# Patient Record
Sex: MALE | Race: OTHER | HISPANIC OR LATINO
[De-identification: names, ages, dates, MRNs, and addresses within clinical notes are randomized per-mention and may not be internally consistent; named-entity substitution may affect disease eponyms.]

---

## 2021-08-25 PROBLEM — Z00.00 ENCOUNTER FOR PREVENTIVE HEALTH EXAMINATION: Status: ACTIVE | Noted: 2021-08-25

## 2021-08-31 ENCOUNTER — APPOINTMENT (OUTPATIENT)
Dept: GASTROENTEROLOGY | Facility: CLINIC | Age: 31
End: 2021-08-31

## 2021-10-05 ENCOUNTER — APPOINTMENT (OUTPATIENT)
Dept: GASTROENTEROLOGY | Facility: CLINIC | Age: 31
End: 2021-10-05
Payer: COMMERCIAL

## 2021-10-05 DIAGNOSIS — Z78.9 OTHER SPECIFIED HEALTH STATUS: ICD-10-CM

## 2021-10-05 DIAGNOSIS — Z82.49 FAMILY HISTORY OF ISCHEMIC HEART DISEASE AND OTHER DISEASES OF THE CIRCULATORY SYSTEM: ICD-10-CM

## 2021-10-05 PROCEDURE — 99204 OFFICE O/P NEW MOD 45 MIN: CPT | Mod: 95

## 2021-10-05 NOTE — ASSESSMENT
[FreeTextEntry1] : 30 year old male patient average risk for CRC, presents with alternating diarrhea-constipation, associated with bloating. No nausea, vomiting, dysphagia, but has a feeling of something stuck in his esophagus and heartburn. Symptoms are not related to food. \par No blood in the stools. Stable weight. \par Reports crampy abdominal pain with urgency and pain resolves thereafter.\par \par R/O GERD\par needs EGD\par Risks and benefits discussed with patient.\par not keen on therapeutic trial

## 2021-10-05 NOTE — HISTORY OF PRESENT ILLNESS
[Home] : at home, [unfilled] , at the time of the visit. [Medical Office: ([facility])___] : at the medical office located in  [Verbal consent obtained from patient] : the patient, [unfilled] [FreeTextEntry4] : Mana Kurtz [de-identified] : 30 year old male patient average risk for CRC, presents with alternating diarrhea-constipation, associated with bloating. No nausea, vomiting, dysphagia, but has a feeling of something stuck in his esophagus and heartburn. Symptoms are not related to food. \par No blood in the stools. Stable weight. \par Reports crampy abdominal pain with urgency and pain resolves thereafter.

## 2021-10-05 NOTE — PHYSICAL EXAM
[General Appearance - Alert] : alert [Hearing Threshold Finger Rub Not [place]] : hearing was normal [] : no respiratory distress [Skin Color & Pigmentation] : normal skin color and pigmentation [Oriented To Time, Place, And Person] : oriented to person, place, and time

## 2021-10-25 ENCOUNTER — LABORATORY RESULT (OUTPATIENT)
Age: 31
End: 2021-10-25

## 2021-10-25 ENCOUNTER — OUTPATIENT (OUTPATIENT)
Dept: OUTPATIENT SERVICES | Facility: HOSPITAL | Age: 31
LOS: 1 days | Discharge: HOME | End: 2021-10-25

## 2021-10-25 DIAGNOSIS — Z11.59 ENCOUNTER FOR SCREENING FOR OTHER VIRAL DISEASES: ICD-10-CM

## 2021-10-28 ENCOUNTER — RESULT REVIEW (OUTPATIENT)
Age: 31
End: 2021-10-28

## 2021-10-28 ENCOUNTER — TRANSCRIPTION ENCOUNTER (OUTPATIENT)
Age: 31
End: 2021-10-28

## 2021-10-28 ENCOUNTER — OUTPATIENT (OUTPATIENT)
Dept: OUTPATIENT SERVICES | Facility: HOSPITAL | Age: 31
LOS: 1 days | Discharge: HOME | End: 2021-10-28
Payer: COMMERCIAL

## 2021-10-28 VITALS
RESPIRATION RATE: 17 BRPM | DIASTOLIC BLOOD PRESSURE: 55 MMHG | HEART RATE: 60 BPM | HEIGHT: 67 IN | OXYGEN SATURATION: 100 % | TEMPERATURE: 97 F | WEIGHT: 160.06 LBS | SYSTOLIC BLOOD PRESSURE: 111 MMHG

## 2021-10-28 VITALS — DIASTOLIC BLOOD PRESSURE: 61 MMHG | HEART RATE: 64 BPM | SYSTOLIC BLOOD PRESSURE: 107 MMHG

## 2021-10-28 DIAGNOSIS — S09.93XA UNSPECIFIED INJURY OF FACE, INITIAL ENCOUNTER: Chronic | ICD-10-CM

## 2021-10-28 PROCEDURE — 88312 SPECIAL STAINS GROUP 1: CPT | Mod: 26

## 2021-10-28 PROCEDURE — 88305 TISSUE EXAM BY PATHOLOGIST: CPT | Mod: 26

## 2021-10-28 PROCEDURE — 43239 EGD BIOPSY SINGLE/MULTIPLE: CPT
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
## 2021-10-28 NOTE — ASU PREOP CHECKLIST - VERIFY SURGICAL SITE/SIDE WITH PATIENT
Stroke-like symptoms vs encephalopathy from UTI  Day #2 of Levaquin - E coli present    Day #1/3 done

## 2021-10-28 NOTE — ASU DISCHARGE PLAN (ADULT/PEDIATRIC) - CARE PROVIDER_API CALL
Linn Dominguez (MD)  Gastroenterology  [address] [place] [address]  Phone: [phone]  Fax: [phone]  Follow Up Time:

## 2021-10-29 LAB — SURGICAL PATHOLOGY STUDY: SIGNIFICANT CHANGE UP

## 2021-11-03 DIAGNOSIS — K21.9 GASTRO-ESOPHAGEAL REFLUX DISEASE WITHOUT ESOPHAGITIS: ICD-10-CM

## 2021-11-03 DIAGNOSIS — K29.80 DUODENITIS WITHOUT BLEEDING: ICD-10-CM

## 2021-11-03 DIAGNOSIS — K22.89 OTHER SPECIFIED DISEASE OF ESOPHAGUS: ICD-10-CM

## 2021-11-03 DIAGNOSIS — K29.70 GASTRITIS, UNSPECIFIED, WITHOUT BLEEDING: ICD-10-CM

## 2021-11-10 PROBLEM — Z78.9 OTHER SPECIFIED HEALTH STATUS: Chronic | Status: ACTIVE | Noted: 2021-10-28

## 2021-11-23 ENCOUNTER — APPOINTMENT (OUTPATIENT)
Dept: GASTROENTEROLOGY | Facility: CLINIC | Age: 31
End: 2021-11-23
Payer: COMMERCIAL

## 2021-11-23 DIAGNOSIS — K21.9 GASTRO-ESOPHAGEAL REFLUX DISEASE W/OUT ESOPHAGITIS: ICD-10-CM

## 2021-11-23 DIAGNOSIS — R14.0 ABDOMINAL DISTENSION (GASEOUS): ICD-10-CM

## 2021-11-23 PROCEDURE — 99213 OFFICE O/P EST LOW 20 MIN: CPT | Mod: 95

## 2021-11-23 NOTE — ASSESSMENT
[FreeTextEntry1] : 30 year old male patient average risk for CRC, presents with alternating diarrhea-constipation, associated with bloating. No nausea, vomiting, dysphagia, but has a feeling of something stuck in his esophagus and heartburn. Symptoms are not related to food. \par No blood in the stools. Stable weight. \par Reports crampy abdominal pain with urgency and pain resolves thereafter.\par s/p EGD which was unremarkable. \par \par Likely IBS-M\par Needs colonoscopy\par will get US of abdomen as well\par Risks and benefits discussed with patient.\par

## 2021-11-23 NOTE — HISTORY OF PRESENT ILLNESS
[Home] : at home, [unfilled] , at the time of the visit. [Medical Office: ([facility])___] : at the medical office located in  [Verbal consent obtained from patient] : the patient, [unfilled] [de-identified] : 30 year old male patient average risk for CRC, presents with alternating diarrhea-constipation, associated with bloating. No nausea, vomiting, dysphagia, but has a feeling of something stuck in his esophagus and heartburn. Symptoms are not related to food. \par No blood in the stools. Stable weight. \par Reports crampy abdominal pain with urgency and pain resolves thereafter.\par s/p EGD which was unremarkable.  [FreeTextEntry4] : Mana Kurtz

## 2021-11-23 NOTE — PHYSICAL EXAM
[General Appearance - Alert] : alert [Hearing Threshold Finger Rub Not [place]] : hearing was normal [] : no respiratory distress [Skin Color & Pigmentation] : normal skin color and pigmentation [Oriented To Time, Place, And Person] : oriented to person, place, and time

## 2022-01-07 ENCOUNTER — LABORATORY RESULT (OUTPATIENT)
Age: 32
End: 2022-01-07

## 2022-11-18 ENCOUNTER — TRANSCRIPTION ENCOUNTER (OUTPATIENT)
Age: 32
End: 2022-11-18

## 2022-12-04 ENCOUNTER — NON-APPOINTMENT (OUTPATIENT)
Age: 32
End: 2022-12-04

## 2023-09-14 ENCOUNTER — EMERGENCY (EMERGENCY)
Facility: HOSPITAL | Age: 33
LOS: 0 days | Discharge: HOME | End: 2023-09-14
Attending: EMERGENCY MEDICINE
Payer: COMMERCIAL

## 2023-09-14 VITALS
RESPIRATION RATE: 18 BRPM | TEMPERATURE: 97 F | SYSTOLIC BLOOD PRESSURE: 116 MMHG | OXYGEN SATURATION: 100 % | HEART RATE: 66 BPM | DIASTOLIC BLOOD PRESSURE: 61 MMHG
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
## 2023-09-14 DIAGNOSIS — S09.93XA UNSPECIFIED INJURY OF FACE, INITIAL ENCOUNTER: Chronic | ICD-10-CM

## 2023-09-14 DIAGNOSIS — S01.81XA LACERATION WITHOUT FOREIGN BODY OF OTHER PART OF HEAD, INITIAL ENCOUNTER: ICD-10-CM

## 2023-09-14 DIAGNOSIS — Z23 ENCOUNTER FOR IMMUNIZATION: ICD-10-CM

## 2023-09-14 PROCEDURE — 99285 EMERGENCY DEPT VISIT HI MDM: CPT | Mod: 25

## 2023-09-14 PROCEDURE — 90471 IMMUNIZATION ADMIN: CPT

## 2023-09-14 PROCEDURE — 90715 TDAP VACCINE 7 YRS/> IM: CPT

## 2023-09-14 PROCEDURE — 13131 CMPLX RPR F/C/C/M/N/AX/G/H/F: CPT

## 2023-09-14 PROCEDURE — 99284 EMERGENCY DEPT VISIT MOD MDM: CPT

## 2023-09-14 RX ORDER — TETANUS TOXOID, REDUCED DIPHTHERIA TOXOID AND ACELLULAR PERTUSSIS VACCINE, ADSORBED 5; 2.5; 8; 8; 2.5 [IU]/.5ML; [IU]/.5ML; UG/.5ML; UG/.5ML; UG/.5ML
0.5 SUSPENSION INTRAMUSCULAR ONCE
Refills: 0 | Status: COMPLETED | OUTPATIENT
Start: 2023-09-14 | End: 2023-09-14

## 2023-09-14 RX ADMIN — TETANUS TOXOID, REDUCED DIPHTHERIA TOXOID AND ACELLULAR PERTUSSIS VACCINE, ADSORBED 0.5 MILLILITER(S): 5; 2.5; 8; 8; 2.5 SUSPENSION INTRAMUSCULAR at 17:31

## 2023-09-14 NOTE — ED PROVIDER NOTE - NPI NUMBER (FOR SYSADMIN USE ONLY) :
I have reviewed and confirmed nurses' notes for patient's medications, allergies, medical history, and surgical history.
[[id number]]

## 2023-09-14 NOTE — ED PROVIDER NOTE - PATIENT PORTAL LINK FT
You can access the FollowMyHealth Patient Portal offered by [facility] by registering at the following website: [email or web address]. By joining [email or web address]’s FollowMyHealth portal, you will also be able to view your health information using other applications (apps) compatible with our system.

## 2023-09-14 NOTE — ED PROVIDER NOTE - CARE PROVIDER_API CALL
Jae Lang  Plastic Surgery  [address] [place] [address]  Phone: [phone]  Fax: [phone]  Follow Up Time:
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
## 2023-09-14 NOTE — ED PROVIDER NOTE - PHYSICAL EXAMINATION
Vital Signs: I have reviewed the initial vital signs.  Constitutional: well-nourished, no acute distress  eyes: perrla, eomi  Musculoskeletal: neck supple, no cervical tenderness, gait steady,  no bony tenderness, no deformity, good peripheral pulses  Integumentary: +laceration jagged to left forehead   Neurologic: awake, alert, extremities’ motor and sensory functions grossly intact, no focal deficits

## 2023-09-14 NOTE — ED PROVIDER NOTE - OBJECTIVE STATEMENT
31 y/o male presents to the Ed with laceration to left forehead s/p fall striking a rock yesterday. patient denies any neck or back pain . no headaches, visual changes, vomiting. no bleeding from wound

## 2023-09-14 NOTE — ED PROVIDER NOTE - CARE PLAN
Principal Discharge DX:	Laceration of face   1 Principal Discharge DX:	Laceration of face  Secondary Diagnosis:	Need for tetanus booster
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
## 2023-09-14 NOTE — ED ADULT NURSE NOTE - NSFALLUNIVINTERV_ED_ALL_ED
Bed/Stretcher in lowest position, wheels locked, appropriate side rails in place/Call bell, personal items and telephone in reach/Instruct patient to call for assistance before getting out of bed/chair/stretcher/Non-slip footwear applied when patient is off stretcher/Bernice to call system/Physically safe environment - no spills, clutter or unnecessary equipment/Purposeful proactive rounding/Room/bathroom lighting operational, light cord in reach

## 2024-03-13 ENCOUNTER — EMERGENCY (EMERGENCY)
Facility: HOSPITAL | Age: 34
LOS: 0 days | Discharge: HOME | End: 2024-03-13
Attending: EMERGENCY MEDICINE
Payer: COMMERCIAL

## 2024-03-13 VITALS
TEMPERATURE: 99 F | HEART RATE: 86 BPM | RESPIRATION RATE: 18 BRPM | DIASTOLIC BLOOD PRESSURE: 58 MMHG | SYSTOLIC BLOOD PRESSURE: 122 MMHG | OXYGEN SATURATION: 100 %

## 2024-03-13 VITALS
WEIGHT: 164.91 LBS | SYSTOLIC BLOOD PRESSURE: 109 MMHG | OXYGEN SATURATION: 99 % | HEART RATE: 100 BPM | RESPIRATION RATE: 18 BRPM | TEMPERATURE: 99 F | DIASTOLIC BLOOD PRESSURE: 55 MMHG | HEIGHT: 66 IN

## 2024-03-13 DIAGNOSIS — R68.83 CHILLS (WITHOUT FEVER): ICD-10-CM

## 2024-03-13 DIAGNOSIS — R06.02 SHORTNESS OF BREATH: ICD-10-CM

## 2024-03-13 DIAGNOSIS — R17 UNSPECIFIED JAUNDICE: ICD-10-CM

## 2024-03-13 DIAGNOSIS — R19.7 DIARRHEA, UNSPECIFIED: ICD-10-CM

## 2024-03-13 DIAGNOSIS — R10.9 UNSPECIFIED ABDOMINAL PAIN: ICD-10-CM

## 2024-03-13 DIAGNOSIS — S09.93XA UNSPECIFIED INJURY OF FACE, INITIAL ENCOUNTER: Chronic | ICD-10-CM

## 2024-03-13 DIAGNOSIS — R11.2 NAUSEA WITH VOMITING, UNSPECIFIED: ICD-10-CM

## 2024-03-13 DIAGNOSIS — D72.829 ELEVATED WHITE BLOOD CELL COUNT, UNSPECIFIED: ICD-10-CM

## 2024-03-13 LAB
ALBUMIN SERPL ELPH-MCNC: 5.4 G/DL — HIGH (ref 3.5–5.2)
ALP SERPL-CCNC: 100 U/L — SIGNIFICANT CHANGE UP (ref 30–115)
ALT FLD-CCNC: 45 U/L — HIGH (ref 0–41)
ANION GAP SERPL CALC-SCNC: 12 MMOL/L — SIGNIFICANT CHANGE UP (ref 7–14)
AST SERPL-CCNC: 31 U/L — SIGNIFICANT CHANGE UP (ref 0–41)
BASOPHILS # BLD AUTO: 0 K/UL — SIGNIFICANT CHANGE UP (ref 0–0.2)
BASOPHILS NFR BLD AUTO: 0 % — SIGNIFICANT CHANGE UP (ref 0–1)
BILIRUB DIRECT SERPL-MCNC: 0.3 MG/DL — SIGNIFICANT CHANGE UP (ref 0–0.3)
BILIRUB INDIRECT FLD-MCNC: 1.5 MG/DL — HIGH (ref 0.2–1.2)
BILIRUB SERPL-MCNC: 1.8 MG/DL — HIGH (ref 0.2–1.2)
BUN SERPL-MCNC: 16 MG/DL — SIGNIFICANT CHANGE UP (ref 10–20)
CALCIUM SERPL-MCNC: 10.4 MG/DL — SIGNIFICANT CHANGE UP (ref 8.4–10.4)
CHLORIDE SERPL-SCNC: 100 MMOL/L — SIGNIFICANT CHANGE UP (ref 98–110)
CO2 SERPL-SCNC: 26 MMOL/L — SIGNIFICANT CHANGE UP (ref 17–32)
CREAT SERPL-MCNC: 1 MG/DL — SIGNIFICANT CHANGE UP (ref 0.7–1.5)
EGFR: 103 ML/MIN/1.73M2 — SIGNIFICANT CHANGE UP
EOSINOPHIL # BLD AUTO: 0.12 K/UL — SIGNIFICANT CHANGE UP (ref 0–0.7)
EOSINOPHIL NFR BLD AUTO: 0.9 % — SIGNIFICANT CHANGE UP (ref 0–8)
GLUCOSE SERPL-MCNC: 130 MG/DL — HIGH (ref 70–99)
HCT VFR BLD CALC: 43.1 % — SIGNIFICANT CHANGE UP (ref 42–52)
HGB BLD-MCNC: 14.9 G/DL — SIGNIFICANT CHANGE UP (ref 14–18)
LACTATE SERPL-SCNC: 1.8 MMOL/L — SIGNIFICANT CHANGE UP (ref 0.7–2)
LIDOCAIN IGE QN: 30 U/L — SIGNIFICANT CHANGE UP (ref 7–60)
LYMPHOCYTES # BLD AUTO: 0.12 K/UL — LOW (ref 1.2–3.4)
LYMPHOCYTES # BLD AUTO: 0.9 % — LOW (ref 20.5–51.1)
MANUAL SMEAR VERIFICATION: SIGNIFICANT CHANGE UP
MCHC RBC-ENTMCNC: 29 PG — SIGNIFICANT CHANGE UP (ref 27–31)
MCHC RBC-ENTMCNC: 34.6 G/DL — SIGNIFICANT CHANGE UP (ref 32–37)
MCV RBC AUTO: 83.9 FL — SIGNIFICANT CHANGE UP (ref 80–94)
MONOCYTES # BLD AUTO: 0.71 K/UL — HIGH (ref 0.1–0.6)
MONOCYTES NFR BLD AUTO: 5.2 % — SIGNIFICANT CHANGE UP (ref 1.7–9.3)
NEUTROPHILS # BLD AUTO: 12.77 K/UL — HIGH (ref 1.4–6.5)
NEUTROPHILS NFR BLD AUTO: 93 % — HIGH (ref 42.2–75.2)
OVALOCYTES BLD QL SMEAR: SLIGHT — SIGNIFICANT CHANGE UP
PLAT MORPH BLD: NORMAL — SIGNIFICANT CHANGE UP
PLATELET # BLD AUTO: 246 K/UL — SIGNIFICANT CHANGE UP (ref 130–400)
PMV BLD: 10.4 FL — SIGNIFICANT CHANGE UP (ref 7.4–10.4)
POIKILOCYTOSIS BLD QL AUTO: SLIGHT — SIGNIFICANT CHANGE UP
POTASSIUM SERPL-MCNC: 4.5 MMOL/L — SIGNIFICANT CHANGE UP (ref 3.5–5)
POTASSIUM SERPL-SCNC: 4.5 MMOL/L — SIGNIFICANT CHANGE UP (ref 3.5–5)
PROT SERPL-MCNC: 8.5 G/DL — HIGH (ref 6–8)
RBC # BLD: 5.14 M/UL — SIGNIFICANT CHANGE UP (ref 4.7–6.1)
RBC # FLD: 11.9 % — SIGNIFICANT CHANGE UP (ref 11.5–14.5)
RBC BLD AUTO: ABNORMAL
SODIUM SERPL-SCNC: 138 MMOL/L — SIGNIFICANT CHANGE UP (ref 135–146)
WBC # BLD: 13.73 K/UL — HIGH (ref 4.8–10.8)
WBC # FLD AUTO: 13.73 K/UL — HIGH (ref 4.8–10.8)

## 2024-03-13 PROCEDURE — 99285 EMERGENCY DEPT VISIT HI MDM: CPT

## 2024-03-13 PROCEDURE — 83690 ASSAY OF LIPASE: CPT

## 2024-03-13 PROCEDURE — 36415 COLL VENOUS BLD VENIPUNCTURE: CPT

## 2024-03-13 PROCEDURE — 74177 CT ABD & PELVIS W/CONTRAST: CPT | Mod: MC

## 2024-03-13 PROCEDURE — 96375 TX/PRO/DX INJ NEW DRUG ADDON: CPT

## 2024-03-13 PROCEDURE — 74177 CT ABD & PELVIS W/CONTRAST: CPT | Mod: 26,MC

## 2024-03-13 PROCEDURE — 96376 TX/PRO/DX INJ SAME DRUG ADON: CPT

## 2024-03-13 PROCEDURE — 96374 THER/PROPH/DIAG INJ IV PUSH: CPT | Mod: XU

## 2024-03-13 PROCEDURE — 93005 ELECTROCARDIOGRAM TRACING: CPT

## 2024-03-13 PROCEDURE — 93010 ELECTROCARDIOGRAM REPORT: CPT

## 2024-03-13 PROCEDURE — 80048 BASIC METABOLIC PNL TOTAL CA: CPT

## 2024-03-13 PROCEDURE — 80076 HEPATIC FUNCTION PANEL: CPT

## 2024-03-13 PROCEDURE — 85025 COMPLETE CBC W/AUTO DIFF WBC: CPT

## 2024-03-13 PROCEDURE — 83605 ASSAY OF LACTIC ACID: CPT

## 2024-03-13 PROCEDURE — 99285 EMERGENCY DEPT VISIT HI MDM: CPT | Mod: 25

## 2024-03-13 RX ORDER — SODIUM CHLORIDE 9 MG/ML
1000 INJECTION INTRAMUSCULAR; INTRAVENOUS; SUBCUTANEOUS ONCE
Refills: 0 | Status: COMPLETED | OUTPATIENT
Start: 2024-03-13 | End: 2024-03-13

## 2024-03-13 RX ORDER — ONDANSETRON 8 MG/1
4 TABLET, FILM COATED ORAL ONCE
Refills: 0 | Status: COMPLETED | OUTPATIENT
Start: 2024-03-13 | End: 2024-03-13

## 2024-03-13 RX ORDER — ONDANSETRON 8 MG/1
1 TABLET, FILM COATED ORAL
Qty: 1 | Refills: 0
Start: 2024-03-13

## 2024-03-13 RX ORDER — FAMOTIDINE 10 MG/ML
20 INJECTION INTRAVENOUS ONCE
Refills: 0 | Status: COMPLETED | OUTPATIENT
Start: 2024-03-13 | End: 2024-03-13

## 2024-03-13 RX ORDER — IOHEXOL 300 MG/ML
30 INJECTION, SOLUTION INTRAVENOUS ONCE
Refills: 0 | Status: COMPLETED | OUTPATIENT
Start: 2024-03-13 | End: 2024-03-13

## 2024-03-13 RX ADMIN — ONDANSETRON 4 MILLIGRAM(S): 8 TABLET, FILM COATED ORAL at 15:48

## 2024-03-13 RX ADMIN — ONDANSETRON 4 MILLIGRAM(S): 8 TABLET, FILM COATED ORAL at 11:23

## 2024-03-13 RX ADMIN — SODIUM CHLORIDE 1000 MILLILITER(S): 9 INJECTION INTRAMUSCULAR; INTRAVENOUS; SUBCUTANEOUS at 11:23

## 2024-03-13 RX ADMIN — SODIUM CHLORIDE 1000 MILLILITER(S): 9 INJECTION INTRAMUSCULAR; INTRAVENOUS; SUBCUTANEOUS at 13:01

## 2024-03-13 RX ADMIN — IOHEXOL 30 MILLILITER(S): 300 INJECTION, SOLUTION INTRAVENOUS at 14:50

## 2024-03-13 RX ADMIN — FAMOTIDINE 20 MILLIGRAM(S): 10 INJECTION INTRAVENOUS at 11:23

## 2024-03-13 RX ADMIN — ONDANSETRON 4 MILLIGRAM(S): 8 TABLET, FILM COATED ORAL at 13:01

## 2024-03-13 NOTE — ED PROVIDER NOTE - PATIENT PORTAL LINK FT
You can access the FollowMyHealth Patient Portal offered by [facility] by registering at the following website: [email or web address]. By joining [employer]’s FollowMyHealth portal, you will also be able to view your health information using other applications (apps) compatible with our system. You can access the FollowMyHealth Patient Portal offered by [facility] by registering at the following website: [email or web address]. By joining [employer]s FollowMyHealth portal, you will also be able to view your health information using other applications (apps) compatible with our system.

## 2024-03-13 NOTE — ED PROVIDER NOTE - INTERPRETATION
normal sinus rhythm EKG: NSR, nml axis, normal intervals, no ST/T changes, nonischemic/normal sinus rhythm

## 2024-03-13 NOTE — ED ADULT NURSE NOTE - NSFALLRISKASMTTYPE_ED_ALL_ED
Render Post-Care Instructions In Note?: no Detail Level: Detailed Show Aperture Variable?: Yes Medical Necessity Information: It is in your best interest to select a reason for this procedure from the list below. All of these items fulfill various CMS LCD requirements except the new and changing color options. Post-Care Instructions: I reviewed with the patient in detail post-care instructions. Patient is to wear sunprotection, and avoid picking at any of the treated lesions. Pt may apply Vaseline to crusted or scabbing areas. Spray Paint Text: The liquid nitrogen was applied to the skin utilizing a spray paint frosting technique. Consent: The patient's consent was obtained including but not limited to risks of crusting, scabbing, blistering, scarring, darker or lighter pigmentary change, recurrence, incomplete removal and infection. Medical Necessity Clause: This procedure was medically necessary because the lesions that were treated were: Initial (On Arrival)

## 2024-03-13 NOTE — ED PROVIDER NOTE - OBJECTIVE STATEMENT
33-year-old male no past medical history presents to the ED complaining of abdominal pain with multiple episodes of nausea and vomiting and chills that started this morning.  Patient states was constipated for few days but had 3 soft bowel movements yesterday.  Patient denies any fever, urinary symptoms, chest pain, shortness of breath or back pain.

## 2024-03-13 NOTE — ED PROVIDER NOTE - NSFOLLOWUPINSTRUCTIONS_ED_ALL_ED_FT
Our Emergency Department Referral Coordinators will be reaching out to you in the next 24-48 hours from 9:00am to 5:00pm with a follow up appointment. Please expect a phone call from the hospital in that time frame. If you do not receive a call or if you have any questions or concerns, you can reach them at   [phone]     Follow up with your primary care doctor within 1 week. Show them your results and have them recheck them within 1 month.     Please return to the emergency department if you have new/changed/worsening abdominal pain, abdominal swelling, inability to tolerate food or drink, blood in stool or black stool, vomiting blood, nausea, vomiting, diarrhea, inability to pass gas or have a bowel movement, testicular pain, fevers >100.4 or other symptoms that you find concerning.

## 2024-03-13 NOTE — ED PROVIDER NOTE - CLINICAL SUMMARY MEDICAL DECISION MAKING FREE TEXT BOX
Patient presented with abdominal pain.  ED workup with minimal leukocytosis and minimal elevation in indirect bilirubin.  Abdomen benign.  Advanced imaging with no acute medical or surgical emergency.  Clinically not consistent with cardiac etiology.  In my opinion, based on current evaluation and results, an acute medical or surgical emergency does not appear to be occurring at this time and I feel that the pt is stable for further outpatient work up and/or treatment.  Advised that he needs close GI follow-up.  Return precautions given

## 2024-03-13 NOTE — ED PROVIDER NOTE - ATTENDING APP SHARED VISIT CONTRIBUTION OF CARE
33-year-old male with possible history of Crohn's disease, states he had diagnosis 6-7 years ago but has not had any testing since for treatment, states he has had an endoscopy in the last couple years that reportedly was normal but no colonoscopy.  Presents with abdominal discomfort, nausea and vomiting since this morning.  Also has chills.  States that he has not had a bowel movement today but has been passing gas.  Has had some episodes of diarrhea over the last couple days and blood on toilet paper when he wipes but not in his stool.  Also states that he has not vomited blood but did have some scant blood that he spit up after he had had vomiting.  Also concerned that he has breath that smells like feces.  No testicular pain. patient concerned he has obstruction.  Also reports some shortness of breath but no chest pain.  No cough . on exam nontoxic, vital signs noted, heart regular no murmurs, lungs clear bilaterally, no wheeze, rales or rhonchi, normal work of breathing.  Abdomen soft, nondistended, nontender throughout.  Based on benign abdominal exam clinically had low initial concern for any intra-abdominal surgical emergency including obstruction so plan for IV fluid and labs.  Given IV Zofran and Pepcid as well as IV fluids and felt improved.  Has mild leukocytosis to 13.7, minimally elevated indirect bilirubin, otherwise labs overall reassuring.  On reassessment patient states that he felt better.  Explained all results and that while no definitive diagnosis was made, in my opinion, based on current evaluation and results, an acute medical or surgical emergency did not appear to be occurring at this time and I feel that the pt is stable for further outpatient work up and/or treatment.  Patient agreed with this plan.  Subsequently patient spoke with family member and no longer felt comfortable going without advanced imaging so CT ordered. disposition pending w/up and reassessment.

## 2024-03-13 NOTE — ED ADULT TRIAGE NOTE - CHIEF COMPLAINT QUOTE
"carol been vomiting all morning with abdominal pain . I think im constipated. I have blood in my stool when I wiped yesterday morning"

## 2024-03-13 NOTE — ED ADULT NURSE NOTE - OBJECTIVE STATEMENT
Patient presents to ED for c/o abdominal pain and vomiting all morning. Patient reports c/o constipation. Patient reports hx of colitis. A&O x4.

## 2024-03-15 NOTE — CHART NOTE - NSCHARTNOTEFT_GEN_A_CORE
[facility] MRN [id number] / Pt wants an appt. book next available 3/14 - JL / Sent patient to [address] [place] [address]. Per Augustina, office will reach out to pt 3/14 - JL / Left message 2x - JL    Specialty: PCP

## 2024-03-18 NOTE — ED ADULT TRIAGE NOTE - PRO INTERPRETER NEED 2
Medical Assistant's notes reviewed and appreciated.    HPI:  Mr. Loredo presents to the office today for a complete physical examination.        Patient is also here for the followin. Recheck hypertension - denies lightheadedness  2. Recheck hyperlipidemia - denies exertional chest pain  3. Recheck ER visit for chest pain on 3/13/24. Had pain 3 days prior to ER visit. Had no injury but does recall lifting something heavy prior to onset of pain.  Pain is nonexertional. Tylenol did not provide relief. Lab work negative. EKG showed occasional PVCs. chest X-ray negative. Pain given Toradol with complete resolution of his pain.     Still gets ringing in both ears     PMHX:    Past Medical History:   Diagnosis Date    Degenerative arthritis of hip 2016    left    Diverticulosis of colon (without mention of hemorrhage) 07    Gallbladder polyp 2017    Gastroesophageal reflux disease     GERD (gastroesophageal reflux disease) 2011    Helicobacter pylori (H. pylori)     treated    HTN (hypertension)     Impotence of organic origin     Malignant neoplasm of prostate (CMD) 10/07    [facility] [place] [facility]    Other and unspecified hyperlipidemia     Tobacco use        PSH:    Past Surgical History:   Procedure Laterality Date    Cataract extraction w/  intraocular lens implant Right     2016    Colonoscopy diagnostic  07    Diverticulosis noted- Repeat in 10 years/ Dr. Anthony Meza    Eye surgery      Foot surgery      x 4 procedures (unspecified)    Inj transforam epidural lumbar/sacral  3/22/06    left Tf-BONIFACIO L4-5:L5-S1              1st.w/sedation    Inj transforam epidural lumbar/sacral  06    Left Tf-BONIFACIO L4-5 /L5-S1            2nd.w/sed.  repear x 2weeks     Joint replacement      Open access colonoscopy  14    mild Diverticulosis, no polyps, Dr. Grimes, repeat 10 years    Remv prostate,retropub,radical  07    Robotic prostatectomy by  Dr. Colby Self    Total hip replacement Left 06/16/2017    Left hip replacement/Dr Junior     Total hip replacement Right 02/03/2020    Right Total Hip Replacement, Direct Anterior~Dr. Simon Junior'    Upper arm/elbow surgery unlisted      left arm procedure unspecified       MEDS:    Current Outpatient Medications   Medication Sig    tadalafil (Cialis) 20 MG tablet Take 1 tablet by mouth daily as needed for Erectile Dysfunction.    albuterol 108 (90 Base) MCG/ACT inhaler Inhale 2 puffs into the lungs every 4 hours as needed for Shortness of Breath or Wheezing (coughing spasms).    naproxen (NAPROSYN) 500 MG tablet Take 1 tablet by mouth 2 times daily as needed for Pain.    spironolactone-hydrochlorothiazide (ALDACTAZIDE) 25-25 MG per tablet Take 2 tablets by mouth daily. Indications: High Blood Pressure Disorder    amLODIPine (NORVASC) 10 MG tablet Take 1 tablet by mouth daily. Indications: High Blood Pressure Disorder    atorvastatin (LIPITOR) 40 MG tablet Take 1 tablet by mouth daily. Indications: high cholesterol    aspirin 81 MG EC tablet Take 1 tablet by mouth daily.    Multiple Vitamins-Minerals (MULTIVITAMIN ADULT PO)      No current facility-administered medications for this visit.         Allergies as of 03/20/2024 - Reviewed 03/20/2024   Allergen Reaction Noted    Sulfa antibiotics RASH 05/22/2004         SOCIAL HISTORY:  Social History     Socioeconomic History    Marital status: /Civil Union     Spouse name: Not on file    Number of children: 4    Years of education: Not on file    Highest education level: Not on file   Occupational History    Occupation: works on paint line     Employer: [employer]     Comment: retired 5/6/21   Tobacco Use    Smoking status: Every Day     Current packs/day: 0.50     Average packs/day: 0.5 packs/day for 45.0 years (22.5 ttl pk-yrs)     Types: Cigarettes    Smokeless tobacco: Never    Tobacco comments:     # of years updated 03/20/24   Vaping Use     Vaping Use: never used   Substance and Sexual Activity    Alcohol use: Not Currently     Alcohol/week: 4.0 standard drinks of alcohol     Types: 4 Cans of beer per week     Comment: quit 23, used to have 7 drinks per day (noted 23)    Drug use: No    Sexual activity: Yes     Partners: Female     Comment:    Other Topics Concern     Service No    Blood Transfusions No    Caffeine Concern Not Asked    Occupational Exposure Not Asked    Hobby Hazards Not Asked    Sleep Concern No    Stress Concern Not Asked    Weight Concern No    Special Diet No    Back Care Not Asked    Exercise Yes     Comment: yard work    Bike Helmet Not Asked    Seat Belt Yes     Comment: wears seat belts    Self-Exams Not Asked   Social History Narrative    Not on file     Social Determinants of Health     Financial Resource Strain: Not on file   Food Insecurity: Not on file   Transportation Needs: Not on file   Physical Activity: Not on file   Stress: Not on file   Social Connections: Not on file   Interpersonal Safety: Not At Risk (3/29/2021)    Interpersonal Safety     Social Determinants: Intimate Partner Violence Past Fear: Not on file     Social Determinants: Intimate Partner Violence Current Fear: Not on file         FAMILY HISTORY:  Review of patient's family status indicates:    Mother                         Alive                     Father                         Alive                     Brother                        Alive                       Comment: x1, HTN    Brother                                      39      Comment: complications from spleen surgery    Son                            Alive                       Comment: x1, well    Daughter                       Alive                     Paternal Grandfather                               Comment: mi    Paternal Uncle                                     Comment: mi    Daughter                       Alive                      Daughter                       Alive                         Family History   Problem Relation Age of Onset    Hypertension Mother     Arrhythmia Mother         pacemaker    Myocardial Infarction Father         s/p CABG    Stroke Father     Hypertension Father     Hypertension Brother     Other Brother         complications from spleen surgery    Patient is unaware of any medical problems Son     Patient is unaware of any medical problems Daughter     Patient is unaware of any medical problems Daughter     Patient is unaware of any medical problems Daughter          REVIEW OF SYSTEMS:    A 10-point review of systems was obtained and is negative unless mentioned in HPI.       Problem list reviewed and updated.     PHYSICAL EXAMINATION:  CONSTITUTIONAL:  The patient appears comfortable, nontoxic, and in no apparent distress. Normal weight Black female.   Vitals:    03/20/24 1228   BP: 136/74   BP Location: RUE - Right upper extremity   Cuff Size: Regular   Pulse: 86   SpO2: 98%   Weight: 64 kg (141 lb)   Height: 5' 9\" (1.753 m)     Patient Reported Vitals           No data to display              SKIN:  No rashes  HEENT:  The conjunctivae/sclerae are clear.  No lesions, rashes, or other abnormalities are appreciated on the eyelids.    NECK:  Supple and nontender without masses, lesions, or bruits.  The thyroid is grossly normal to palpation without masses, goiter, asymmetry, or tenderness.  The trachea is midline.  RESPIRATORY:  Lungs are clear to auscultation without rales, wheezes, or rhonchi.  The respiratory effort appears normal.  CARDIAC:  Regular rate and rhythm without S3, S4.    ABDOMEN:  Soft and nontender.  No hepatosplenomegaly or masses  LYMPHATIC:  No abnormal cervical lymph nodes are found on palpation.  MUSCULOSKELETAL:  Extremities are without clubbing, cyanosis, or edema.  DP/PT 2+ bilaterally  NEUROLOGICAL:  Alert and oriented x3. Grossly intact and nonfocal.   :  Deferred  RECTAL:   Deferred.    Component      Latest Ref Rng 3/13/2024  10:48 AM   WBC      4.2 - 11.0 K/mcL 6.6    RBC      4.50 - 5.90 mil/mcL 4.85    HGB      13.0 - 17.0 g/dL 14.0    HCT      39.0 - 51.0 % 41.2    MCV      78.0 - 100.0 fl 84.9    MCH      26.0 - 34.0 pg 28.9    MCHC      32.0 - 36.5 g/dL 34.0    RDW-CV      11.0 - 15.0 % 14.1    RDW-SD      39.0 - 50.0 fL 43.5    PLT      140 - 450 K/mcL 202    NRBC      <=0 /100 WBC 0    Neutrophil      % 74    LYMPH      % 20    MONO      % 5    EOSIN      % 1    BASO      % 0    Immature Granulocytes      % 0    Absolute Neutrophil      1.8 - 7.7 K/mcL 4.8    Absolute Lymph      1.0 - 4.0 K/mcL 1.4    Absolute Mono      0.3 - 0.9 K/mcL 0.3    Absolute Eos      0.0 - 0.5 K/mcL 0.0    Absolute Baso      0.0 - 0.3 K/mcL 0.0    Absolute Immature Granulocytes      0.0 - 0.2 K/mcL 0.0    Sodium      135 - 145 mmol/L 140    Potassium      3.4 - 5.1 mmol/L 3.7    Chloride      97 - 110 mmol/L 103    CO2      21 - 32 mmol/L 26    ANION GAP      7 - 19 mmol/L 15    Glucose      70 - 99 mg/dL 101 (H)    BUN      6 - 20 mg/dL 21 (H)    Creatinine      0.67 - 1.17 mg/dL 0.93    Glomerular Filtration Rate      >=60  89    BUN/CREATININE RATIO      7 - 25  23    CALCIUM      8.4 - 10.2 mg/dL 9.0    TOTAL BILIRUBIN      0.2 - 1.0 mg/dL 0.5    AST/SGOT      <=37 Units/L 33    ALT/SGPT      <64 Units/L 61    ALK PHOSPHATASE      45 - 117 Units/L 92    Albumin      3.6 - 5.1 g/dL 4.2    TOTAL PROTEIN      6.4 - 8.2 g/dL 8.2    GLOBULIN      2.0 - 4.0 g/dL 4.0    A/G Ratio, Serum      1.0 - 2.4  1.1       Legend:  (H) High        ASSESSMENT/PLAN:  Complete examination.  Tests/consults/medications:  See order section.   1.  Hypertension - BP controlled with current therapy  2.  Hyperlipidemia -- Continue on atorvastatin. Recheck lipids   3.  History of prostate cancer -- last PSA was undetectable. Continue f/u with Dr. Self - next due 7/2024  4.  COPD, multiple lung nodules, tobacco abuse - pt  trying to quit smoking. Next screening lung CT due 01/2025. 4 minutes spent with smoking cessation counseling   5. Chest wall pain - resolved. Was likely musculoskeletal    I will be in touch with the patient when all of the test results are available, and he should call if there are any questions or concerns.     Disposition as below.     MEDICARE WELLNESS VISIT NOTE    HPI:    Alex Loredo Sr. presents for his Subsequent Annual Medicare Wellness Exam.   He has complaints or concerns -- see above.    Patient Care Team:  Terrell Bernabe MD as PCP - General  William Muñoz MD (Orthopedic Surgery)  William Chris DPM (Podiatry)  Colby Self MD (Urology)  Shamar Grimes MD (Gastroenterology)  Siomn Junior MD (Orthopedic Surgery)    Patient Active Problem List   Diagnosis    Impotence of organic origin    Inguinal hernia without mention of obstruction or gangrene, unilateral or unspecified, (not specified as recurrent)    Malignant neoplasm of prostate (CMD)    Tobacco use disorder    Hyperlipemia, mixed    Essential hypertension, benign    Degenerative arthritis of hip    Gallbladder polyp    Lumbosacral spondylosis without myelopathy    Neural foraminal stenosis of lumbar spine    Arthritis of hip    Coronary artery calcification seen on CAT scan -- moderate    Multiple lung nodules    Closed avulsion fracture of right ankle    History of prostate cancer    Chronic bilateral hip pain after total replacement of both hip joints    Other emphysema (CMD)    Abnormal liver function tests    Hypomagnesemia    Calculus of gallbladder without cholecystitis without obstruction    Prediabetes        Past Medical History:   Diagnosis Date    Degenerative arthritis of hip 12/9/2016    left    Diverticulosis of colon (without mention of hemorrhage) 1/29/07    Gallbladder polyp 1/19/2017    Gastroesophageal reflux disease     GERD (gastroesophageal reflux disease) 7/7/2011    Helicobacter pylori (H.  English pylori) 1/08    treated    HTN (hypertension)     Impotence of organic origin     Malignant neoplasm of prostate (CMD) 10/07    se[facility] Dr[facility] Colby [facility] at [facility] [place] [facility]    Other and unspecified hyperlipidemia     Tobacco use        Past Surgical History:   Procedure Laterality Date    Cataract extraction w/  intraocular lens implant Right     9/2016    Colonoscopy diagnostic  1/29/07    Diverticulosis noted- Repeat in 10 years/ Dr. Anthony Meza    Eye surgery      Foot surgery      x 4 procedures (unspecified)    Inj transforam epidural lumbar/sacral  3/22/06    left Tf-BONIFACIO L4-5:L5-S1              1st.w/sedation    Inj transforam epidural lumbar/sacral  6/8/06    Left Tf-BONIFACIO L4-5 /L5-S1            2nd.w/sed.  repear x 2weeks     Joint replacement      Open access colonoscopy  5/23/14    mild Diverticulosis, no polyps, Dr. Grimes, repeat 10 years    Remv prostate,retropub,radical  11/26/07    Robotic prostatectomy by Dr. Colby Self    Total hip replacement Left 06/16/2017    Left hip replacement/Dr Junior     Total hip replacement Right 02/03/2020    Right Total Hip Replacement, Direct Anterior~Dr. Simon Junior'    Upper arm/elbow surgery unlisted      left arm procedure unspecified        Family History   Problem Relation Age of Onset    Hypertension Mother     Arrhythmia Mother         pacemaker    Myocardial Infarction Father         s/p CABG    Stroke Father     Hypertension Father     Hypertension Brother     Other Brother         complications from spleen surgery    Patient is unaware of any medical problems Son     Patient is unaware of any medical problems Daughter     Patient is unaware of any medical problems Daughter     Patient is unaware of any medical problems Daughter         Review of patient's family status indicates:    Mother                         Alive                     Father                         Alive                     Brother                        Alive                        Comment: x1, HTN    Brother                                      39      Comment: complications from spleen surgery    Son                            Alive                       Comment: x1, well    Daughter                       Alive                     Paternal Grandfather                               Comment: mi    Paternal Uncle                                     Comment: mi    Daughter                       Alive                     Daughter                       Alive                        Drug use:   Patient Active Problem List     Substance and Sexual Activity   Drug Use No       Current Outpatient Medications   Medication Sig    tadalafil (Cialis) 20 MG tablet Take 1 tablet by mouth daily as needed for Erectile Dysfunction.    albuterol 108 (90 Base) MCG/ACT inhaler Inhale 2 puffs into the lungs every 4 hours as needed for Shortness of Breath or Wheezing (coughing spasms).    naproxen (NAPROSYN) 500 MG tablet Take 1 tablet by mouth 2 times daily as needed for Pain.    spironolactone-hydrochlorothiazide (ALDACTAZIDE) 25-25 MG per tablet Take 2 tablets by mouth daily. Indications: High Blood Pressure Disorder    amLODIPine (NORVASC) 10 MG tablet Take 1 tablet by mouth daily. Indications: High Blood Pressure Disorder    atorvastatin (LIPITOR) 40 MG tablet Take 1 tablet by mouth daily. Indications: high cholesterol    aspirin 81 MG EC tablet Take 1 tablet by mouth daily.    Multiple Vitamins-Minerals (MULTIVITAMIN ADULT PO)      No current facility-administered medications for this visit.          The following items on the Medicare Health Risk Assessment were found to be positive  1.) Do you have an Advance directive, living will, or power of  for health care document that contains your wishes for end of life care?: No     6 a.) How many servings of Fruits and Vegetables do you have each day ( 1 serving = 1 piece of fruit, 1/2 cup fruits or vegetables): 1  per day     6 b.) How many servings of High Fiber / Whole Grain Foods to you have each day ( 1 serving = 1 cup cold cereal, 1/2 cup cooked cereal, 1 slice bread): 1 per day     6 d.) How many servings of Sugar Sweetened Beverages do you have each day ( 1 serving = 1 can or 12 oz cup of sode or juice): 2 per day     11h.) Problems with your hearing: Often     11j.) Driven/Ridden in a car without wearing your seatbelt: Seldom            Vision and Hearing screens:   Vision: not applicable   Hearing screen: not applicable     Advance Directive:   The patient has the following documents:  Advance care planning documents on file - no     Cognitive Assessment: no evidence of cognitive dysfunction by direct observation    Opioid Review: Alex is not taking opioid medications.    Recent PHQ 2/9 Score    PHQ 2:  PHQ 2 Score Adult PHQ 2 Score Adult PHQ 2 Interpretation Little interest or pleasure in activity?   3/20/2024  12:52 PM 0 No further screening needed 0       PHQ 9:  PHQ 9 Score Adult PHQ 9 Score Adult PHQ 9 Interpretation   3/20/2024  12:52 PM 1 Minimal Depression         DEPRESSION ASSESSMENT/PLAN:  Depression screening is negative no further plan needed.     Body mass index is 20.82 kg/m².     BMI ASSESSMENT/PLAN:  Patient BMI is within normal range.     Needed Screening/Treatment:   Colorectal cancer screening (due 06/2024)  Needed follow up:  Further audiology evaluation - declines at this time    See orders.   See Patient Instructions section.   Return in about 1 year (around 3/20/2025) for Medicare Wellness Visit.     The following diagnoses have been reviewed with the patient at today's visit and appropriate recommendations made:    Medicare annual wellness visit, subsequent  (primary encounter diagnosis)  Routine medical exam  Essential hypertension, benign  Hyperlipemia, mixed  Abnormal liver function tests  History of prostate cancer  Multiple lung nodules  Other emphysema (CMD)  Tobacco use  disorder  Chest wall pain  Prediabetes  Screening for colon cancer      In the interest of transparency, the 21st Century Cures Act requires healthcare organizations to make nearly all medical information readily available to patients. Please remember that this document is intended as a form of “[id number]” communication. Often medical records contain verbiage and abbreviations that might be unfamiliar and without context. Language may appear direct as it is intended to succinctly relay the clinical opinion of the practitioner. This document is not intended to be a comprehensive summary of the medical record. Plans may change based on information that is not conveyed in this note.

## 2024-04-23 ENCOUNTER — APPOINTMENT (OUTPATIENT)
Dept: GASTROENTEROLOGY | Facility: CLINIC | Age: 34
End: 2024-04-23
Payer: COMMERCIAL

## 2024-04-23 DIAGNOSIS — K59.00 CONSTIPATION, UNSPECIFIED: ICD-10-CM

## 2024-04-23 DIAGNOSIS — R11.10 VOMITING, UNSPECIFIED: ICD-10-CM

## 2024-04-23 DIAGNOSIS — R19.4 CHANGE IN BOWEL HABIT: ICD-10-CM

## 2024-04-23 DIAGNOSIS — Z78.9 OTHER SPECIFIED HEALTH STATUS: ICD-10-CM

## 2024-04-23 PROCEDURE — 99215 OFFICE O/P EST HI 40 MIN: CPT

## 2024-04-23 PROCEDURE — 99204 OFFICE O/P NEW MOD 45 MIN: CPT

## 2024-04-23 RX ORDER — SODIUM SULFATE, POTASSIUM SULFATE AND MAGNESIUM SULFATE 1.6; 3.13; 17.5 G/177ML; G/177ML; G/177ML
17.5-3.13-1.6 SOLUTION ORAL
Qty: 2 | Refills: 0 | Status: ACTIVE | COMMUNITY
Start: 2024-04-23 | End: 1900-01-01

## 2024-04-23 RX ORDER — SODIUM PICOSULFATE, MAGNESIUM OXIDE, AND ANHYDROUS CITRIC ACID 10; 3.5; 12 MG/160ML; G/160ML; G/160ML
10-3.5-12 MG-GM LIQUID ORAL
Qty: 1 | Refills: 0 | Status: DISCONTINUED | COMMUNITY
Start: 2021-11-23 | End: 2024-04-23

## 2024-04-23 NOTE — PHYSICAL EXAM
[Alert] : alert [Normal Voice/Communication] : normal voice/communication [Healthy Appearing] : healthy appearing [Sclera] : the sclera and conjunctiva were normal [No Acute Distress] : no acute distress [Hearing Threshold Finger Rub Not [place]] : hearing was normal [Normal Lips/Gums] : the lips and gums were normal [Oropharynx] : the oropharynx was normal [Normal Appearance] : the appearance of the neck was normal [No Neck Mass] : no neck mass was observed [No Respiratory Distress] : no respiratory distress [No Acc Muscle Use] : no accessory muscle use [Respiration, Rhythm And Depth] : normal respiratory rhythm and effort [Auscultation Breath Sounds / Voice Sounds] : lungs were clear to auscultation bilaterally [Heart Rate And Rhythm] : heart rate was normal and rhythm regular [Normal S1, S2] : normal S1 and S2 [Murmurs] : no murmurs [Bowel Sounds] : normal bowel sounds [Abdomen Tenderness] : non-tender [No Masses] : no abdominal mass palpated [Abdomen Soft] : soft [] : no hepatosplenomegaly [Oriented To Time, Place, And Person] : oriented to person, place, and time

## 2024-04-23 NOTE — HISTORY OF PRESENT ILLNESS
[FreeTextEntry1] : 33 year old male patient average risk for CRC, presented her in 2021 with alternating diarrhea-constipation, associated with bloating. US of Abdomen was ordered but never done due to patient's bloating had gotten better. He was supposed to have a colonoscopy for chronic diarrhea but it was never done. He presents here today C/O severe constipation ( he didn't have a BM x 10 days) about 2 months ago which led to vomiting, abdominal pain, bloating, and rectal bleeding x 1. He went to the ED on 3/13/24; CT scan of Abdomen done and was unremarkable. CBC with mild leukocytosis (13 K), CMP with tot bili 1.8, ALT 45. He has a BM QOD with frequent diarrhea (up to 3 BMs a day) occurring 3-4 days a week. He denied any particular foods causing the diarrhea. He denied further abdominal pain, rectal bleeding, He denied weight loss, further vomiting,    EGD done 10/21 was unremarkable; pathology was negative for Celiac Disease, HP, IM, and dysplasia

## 2024-04-23 NOTE — ASSESSMENT
[FreeTextEntry1] : 33 year old male patient average risk for CRC, presented her in 2021 with alternating diarrhea-constipation, associated with bloating. US of Abdomen was ordered but never done due to patient's bloating had gotten better. He was supposed to have a colonoscopy for chronic diarrhea but it was never done. He presents here today C/O severe constipation ( he didn't have a BM x 10 days) about 2 months ago which led to vomiting, abdominal pain, bloating, and rectal bleeding x 1. He went to the ED on 3/13/24; CT scan of Abdomen done and was unremarkable. CBC with mild leukocytosis (13 K), CMP with tot bili 1.8, ALT 45. He has a BM QOD with frequent diarrhea (up to 3 BMs a day) occurring 3-4 days a week. He denied any particular foods causing the diarrhea. He denied further abdominal pain, rectal bleeding, He denied weight loss, further vomiting,    EGD done 10/21 was unremarkable; pathology was negative for Celiac Disease, HP, IM, and dysplasia   Change in bowel habits Constipation, alternating with diarrhea, likely IBS-M -Will schedule a colonoscopy; risks and benefits discussed - He was told to take Colace, senokot, and miralax OD prn/constipation

## 2024-08-21 ENCOUNTER — RESULT REVIEW (OUTPATIENT)
Age: 34
End: 2024-08-21

## 2024-08-21 ENCOUNTER — TRANSCRIPTION ENCOUNTER (OUTPATIENT)
Age: 34
End: 2024-08-21

## 2025-03-02 ENCOUNTER — EMERGENCY (EMERGENCY)
Facility: HOSPITAL | Age: 35
LOS: 0 days | Discharge: HOME | End: 2025-03-02
Attending: EMERGENCY MEDICINE
Payer: COMMERCIAL

## 2025-03-02 VITALS
TEMPERATURE: 98 F | DIASTOLIC BLOOD PRESSURE: 85 MMHG | HEART RATE: 76 BPM | WEIGHT: 164.91 LBS | SYSTOLIC BLOOD PRESSURE: 137 MMHG | OXYGEN SATURATION: 98 % | HEIGHT: 66 IN | RESPIRATION RATE: 18 BRPM
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
## 2025-03-02 DIAGNOSIS — F17.200 NICOTINE DEPENDENCE, UNSPECIFIED, UNCOMPLICATED: ICD-10-CM
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
## 2025-03-02 DIAGNOSIS — M25.511 PAIN IN RIGHT SHOULDER: ICD-10-CM

## 2025-03-02 DIAGNOSIS — S09.93XA UNSPECIFIED INJURY OF FACE, INITIAL ENCOUNTER: Chronic | ICD-10-CM

## 2025-03-02 DIAGNOSIS — M54.50 LOW BACK PAIN, UNSPECIFIED: ICD-10-CM

## 2025-03-02 DIAGNOSIS — S39.92XA UNSPECIFIED INJURY OF LOWER BACK, INITIAL ENCOUNTER: ICD-10-CM

## 2025-03-02 PROCEDURE — 99284 EMERGENCY DEPT VISIT MOD MDM: CPT

## 2025-03-02 PROCEDURE — 99283 EMERGENCY DEPT VISIT LOW MDM: CPT | Mod: 25

## 2025-03-02 PROCEDURE — 96372 THER/PROPH/DIAG INJ SC/IM: CPT

## 2025-03-02 RX ORDER — KETOROLAC TROMETHAMINE 30 MG/ML
60 INJECTION, SOLUTION INTRAMUSCULAR; INTRAVENOUS ONCE
Refills: 0 | Status: DISCONTINUED | OUTPATIENT
Start: 2025-03-02 | End: 2025-03-02

## 2025-03-02 RX ADMIN — KETOROLAC TROMETHAMINE 60 MILLIGRAM(S): 30 INJECTION, SOLUTION INTRAMUSCULAR; INTRAVENOUS at 09:07
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
## 2025-03-02 NOTE — ED PROVIDER NOTE - PATIENT PORTAL LINK FT
You can access the FollowMyHealth Patient Portal offered by [facility] by registering at the following website: [email or web address]. By joining [employer]’s FollowMyHealth portal, you will also be able to view your health information using other applications (apps) compatible with our system.
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
## 2025-03-02 NOTE — ED PROVIDER NOTE - PHYSICAL EXAMINATION
Vital Signs: I have reviewed the initial vital signs.  Constitutional: NAD, well-nourished, appears stated age, no acute distress.  HEENT: Airway patent, moist MM, no erythema/swelling/deformity of oral structures. EOMI, PERRLA.  CV: regular rate, regular rhythm, well-perfused extremities, 2+ b/l DP and radial pulses equal.  Lungs: BCTA, no increased WOB.  ABD: NT, ND, no guarding or rebound, no pulsatile mass, no hernias.   MSK: Neck supple, nontender, nl ROM, no stepoff. Chest nontender. +lateral back tenderness, no midline tenderness. nl rom right shoulder, no deformity.   INTEG: Skin warm, dry, no rash.  NEURO: A&Ox3, normal strength, nl sensation throughout, normal speech.   PSYCH: Calm, cooperative, normal affect and interaction.